# Patient Record
Sex: MALE | Race: WHITE | NOT HISPANIC OR LATINO | Employment: FULL TIME | URBAN - METROPOLITAN AREA
[De-identification: names, ages, dates, MRNs, and addresses within clinical notes are randomized per-mention and may not be internally consistent; named-entity substitution may affect disease eponyms.]

---

## 2017-10-31 ENCOUNTER — THERAPY VISIT (OUTPATIENT)
Dept: PHYSICAL THERAPY | Facility: CLINIC | Age: 52
End: 2017-10-31
Payer: COMMERCIAL

## 2017-10-31 DIAGNOSIS — M54.2 NECK PAIN: Primary | ICD-10-CM

## 2017-10-31 PROCEDURE — 97161 PT EVAL LOW COMPLEX 20 MIN: CPT | Mod: GP | Performed by: PHYSICAL THERAPIST

## 2017-10-31 PROCEDURE — 97110 THERAPEUTIC EXERCISES: CPT | Mod: GP | Performed by: PHYSICAL THERAPIST

## 2017-10-31 NOTE — LETTER
[facility] PHYSICAL THERAPY  [address] [address]  [phone]    2017    Re: Ha Dowell   :   1965  MRN:  [id number]   REFERRING PHYSICIAN:   Jacklyn Vásquez    [facility] PHYSICAL THERAPY    Date of Initial Evaluation:  10/31/2017  Visits:  Rxs Used: 1  Reason for Referral:  Neck pain    EVALUATION SUMMARY    Subjective:  Patient is a 52 year old male presenting with rehab cervical spine hpi. Ha Dowell is a 52 year old male with a cervical spine condition.  Condition occurred with:  Degenerative joint disease.  Condition occurred: at home.  This is a new condition  Jacob reports today with complaints of Neck pain of which he has had issues with over the years. He reports that he has been noticing numbness on the R side of his face. He reports that tipping his head to the L gets rid of it. He gets it roughly 6 - 8 times a day. He states that sleeping on his R side is better than the L. He denies pain down his arm other than R tennis elbow type symptoms. He states that the pain in his neck is more on the R and when that is worse so is the numbness on the R side of his face..  Patient reports pain:  Cervical right side, upper cervical spine, cervical left side, mid cervical spine, central cervical spine and lower cervical spine.  Radiates to:  Face, shoulder right and head.  Pain is described as aching and is intermittent and reported as 8/10 and 4/10.  Associated symptoms:  Loss of strength, loss of motion/stiffness and headache. Pain is the same all the time.  Symptoms are exacerbated by looking up or down, carrying, certain positions, change of position, rotating head, lifting, lying down and driving and relieved by activity/movement.  Since onset symptoms are unchanged.  Special testing: none.  Previous treatment: none.  Improvement with previous treatment: na.  General health as reported by patient is excellent.  Pertinent medical history  includes:  None.  Medical allergies: no.  Other surgeries include:  No.  Current medications:  None as reported by the patient.  Current occupation is Desk job property management.  Patient is working in normal job without restrictions.  Primary job tasks include:  Prolonged sitting, repetitive tasks and prolonged standing.Barriers include:  None as reported by the patient. Red flags:  None as reported by the patient.              Objective:  CERVICAL:  Posture: forward head and rounded shoulders.   Neurological:  Motor Deficit:  Myotomes L R   C4 (shoulder elevation) wnl wnl   C5 (shoulder abduction) wnl wnl   C6 (elbow flexion) wnl wnl   C7 (elbow extension) wnl wnl   C8 (thumb extension) wnl wnl   T1 (finger add/abd) wnl wnl    Strength (lb)     Sensory Deficit, Reflexes, Dural Signs: wnl  AROM: (Major, Moderate, Minimal or Nil loss)  Movement Loss Ciro Mod Min Nil Pain   Protrusion   x     Flexion   x     Retraction  x      Extension  x      Left Rotation  x      Right Rotation  x      Left Side Bending  x      Right Side bending  x        Repeated movement testing:   (During: produces, abolishes, increases, decreases, no effect, centralizing, peripheralizing; After: better, worse, no better, no worse, no effect, centralized, peripheralized)  Repeated cerv retraction increased ROM but still had tension in suboccipitals and R levator  (-) Spurlings and reverse sprulings  Suspect some facet arthopathy as well as muscle tension. The numbness in and along R jaw was difficult to cause today upon eval. I suspect that that is due to referred pain from upper cervical / suboccipital tension. Will continue to assess.     POC decrease tension in upper cervical, up trap , levator primarily on R side. Progress postural exercises and scap stab.     Assessment/Plan:    Patient is a 52 year old male with cervical complaints.    Patient has the following significant findings with corresponding treatment plan.                 Diagnosis 1:  Neck pain  Pain -  hot/cold therapy, US, electric stimulation, mechanical traction, manual therapy, self management, education, directional preference exercise and home program  Decreased ROM/flexibility - manual therapy, therapeutic exercise, therapeutic activity and home program  Decreased joint mobility - manual therapy, therapeutic exercise, therapeutic activity and home program  Decreased strength - therapeutic exercise, therapeutic activities and home program  Impaired muscle performance - neuro re-education and home program  Decreased function - therapeutic activities and home program  Impaired posture - neuro re-education, therapeutic activities and home program  Therapy Evaluation Codes:   1) History comprised of:   Personal factors that impact the plan of care:      Time since onset of symptoms and Work status.    Comorbidity factors that impact the plan of care are:      None.     Medications impacting care: None.  2) Examination of Body Systems comprised of:   Body structures and functions that impact the plan of care:      Cervical spine.   Activity limitations that impact the plan of care are:      Driving, Dressing, Reading/Computer work, Sitting, Walking, Working and Sleeping.  3) Clinical presentation characteristics are:   Stable/Uncomplicated.  4) Decision-Making    Low complexity using standardized patient assessment instrument and/or measureable assessment of functional outcome.  Cumulative Therapy Evaluation is: Low complexity.  Previous and current functional limitations:  (See Goal Flow Sheet for this information)    Short term and Long term goals: (See Goal Flow Sheet for this information)   Communication ability:  Patient appears to be able to clearly communicate and understand verbal and written communication and follow directions correctly.  Treatment Explanation - The following has been discussed with the patient:   RX ordered/plan of care  Anticipated outcomes  Possible  risks and side effects  This patient would benefit from PT intervention to resume normal activities.   Rehab potential is good.  Frequency:  1 X week, once daily  Duration:  for 8 weeks  Discharge Plan:  Achieve all LTG.  Independent in home treatment program.  Reach maximal therapeutic benefit.            Thank you for your referral.    INQUIRIES  Therapist:JOANA Ayala [facility] PHYSICAL THERAPY  [address] [place] [address]  Phone: [phone]  Fax: [phone]

## 2017-10-31 NOTE — PROGRESS NOTES
Subjective:    Patient is a 52 year old male presenting with rehab cervical spine hpi.   Ha Dowell is a 52 year old male with a cervical spine condition.  Condition occurred with:  Degenerative joint disease.  Condition occurred: at home.  This is a new condition  Jacob reports today with complaints of Neck pain of which he has had issues with over the years. He reports that he has been noticing numbness on the R side of his face. He reports that tipping his head to the L gets rid of it. He gets it roughly 6 - 8 times a day. He states that sleeping on his R side is better than the L. He denies pain down his arm other than R tennis elbow type symptoms. He states that the pain in his neck is more on the R and when that is worse so is the numbness on the R side of his face..    Patient reports pain:  Cervical right side, upper cervical spine, cervical left side, mid cervical spine, central cervical spine and lower cervical spine.  Radiates to:  Face, shoulder right and head.  Pain is described as aching and is intermittent and reported as 8/10 and 4/10.  Associated symptoms:  Loss of strength, loss of motion/stiffness and headache. Pain is the same all the time.  Symptoms are exacerbated by looking up or down, carrying, certain positions, change of position, rotating head, lifting, lying down and driving and relieved by activity/movement.  Since onset symptoms are unchanged.  Special testing: none.  Previous treatment: none.  Improvement with previous treatment: na.  General health as reported by patient is excellent.  Pertinent medical history includes:  None.  Medical allergies: no.  Other surgeries include:  No.  Current medications:  None as reported by the patient.  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  Patient is working in normal job without restrictions.  Primary job tasks include:  Prolonged sitting, repetitive tasks and prolonged standing.    Barriers include:  None as reported by the  patient.    Red flags:  None as reported by the patient.                        Objective:  CERVICAL:    Posture: forward head and rounded shoulders.     Neurological:    Motor Deficit:  Myotomes L R   C4 (shoulder elevation) wnl wnl   C5 (shoulder abduction) wnl wnl   C6 (elbow flexion) wnl wnl   C7 (elbow extension) wnl wnl   C8 (thumb extension) wnl wnl   T1 (finger add/abd) wnl wnl    Strength (lb)       Sensory Deficit, Reflexes, Dural Signs: wnl    AROM: (Major, Moderate, Minimal or Nil loss)  Movement Loss Ciro Mod Min Nil Pain   Protrusion   x     Flexion   x     Retraction  x      Extension  x      Left Rotation  x      Right Rotation  x      Left Side Bending  x      Right Side bending  x        Repeated movement testing:   (During: produces, abolishes, increases, decreases, no effect, centralizing, peripheralizing; After: better, worse, no better, no worse, no effect, centralized, peripheralized)    Repeated cerv retraction increased ROM but still had tension in suboccipitals and R levator    (-) Spurlings and reverse sprulings      Suspect some facet arthopathy as well as muscle tension. The numbness in and along R jaw was difficult to cause today upon eval. I suspect that that is due to referred pain from upper cervical / suboccipital tension. Will continue to assess.     POC decrease tension in upper cervical, up trap , levator primarily on R side. Progress postural exercises and scap stab.     System    Physical Exam    General     ROS    Assessment/Plan:      Patient is a 52 year old male with cervical complaints.    Patient has the following significant findings with corresponding treatment plan.                Diagnosis 1:  Neck pain  Pain -  hot/cold therapy, US, electric stimulation, mechanical traction, manual therapy, self management, education, directional preference exercise and home program  Decreased ROM/flexibility - manual therapy, therapeutic exercise, therapeutic activity and home  program  Decreased joint mobility - manual therapy, therapeutic exercise, therapeutic activity and home program  Decreased strength - therapeutic exercise, therapeutic activities and home program  Impaired muscle performance - neuro re-education and home program  Decreased function - therapeutic activities and home program  Impaired posture - neuro re-education, therapeutic activities and home program    Therapy Evaluation Codes:   1) History comprised of:   Personal factors that impact the plan of care:      Time since onset of symptoms and Work status.    Comorbidity factors that impact the plan of care are:      None.     Medications impacting care: None.  2) Examination of Body Systems comprised of:   Body structures and functions that impact the plan of care:      Cervical spine.   Activity limitations that impact the plan of care are:      Driving, Dressing, Reading/Computer work, Sitting, Walking, Working and Sleeping.  3) Clinical presentation characteristics are:   Stable/Uncomplicated.  4) Decision-Making    Low complexity using standardized patient assessment instrument and/or measureable assessment of functional outcome.  Cumulative Therapy Evaluation is: Low complexity.    Previous and current functional limitations:  (See Goal Flow Sheet for this information)    Short term and Long term goals: (See Goal Flow Sheet for this information)     Communication ability:  Patient appears to be able to clearly communicate and understand verbal and written communication and follow directions correctly.  Treatment Explanation - The following has been discussed with the patient:   RX ordered/plan of care  Anticipated outcomes  Possible risks and side effects  This patient would benefit from PT intervention to resume normal activities.   Rehab potential is good.    Frequency:  1 X week, once daily  Duration:  for 8 weeks  Discharge Plan:  Achieve all LTG.  Independent in home treatment program.  Reach maximal  therapeutic benefit.    Please refer to the daily flowsheet for treatment today, total treatment time and time spent performing 1:1 timed codes.

## 2017-11-07 ENCOUNTER — THERAPY VISIT (OUTPATIENT)
Dept: PHYSICAL THERAPY | Facility: CLINIC | Age: 52
End: 2017-11-07
Payer: COMMERCIAL

## 2017-11-07 DIAGNOSIS — M54.2 NECK PAIN: ICD-10-CM

## 2017-11-07 PROCEDURE — 97140 MANUAL THERAPY 1/> REGIONS: CPT | Mod: GP | Performed by: PHYSICAL THERAPY ASSISTANT

## 2017-11-07 PROCEDURE — 97110 THERAPEUTIC EXERCISES: CPT | Mod: GP | Performed by: PHYSICAL THERAPY ASSISTANT

## 2017-11-14 ENCOUNTER — THERAPY VISIT (OUTPATIENT)
Dept: PHYSICAL THERAPY | Facility: CLINIC | Age: 52
End: 2017-11-14
Payer: COMMERCIAL

## 2017-11-14 DIAGNOSIS — M54.2 NECK PAIN: ICD-10-CM

## 2017-11-14 PROCEDURE — 97140 MANUAL THERAPY 1/> REGIONS: CPT | Mod: GP | Performed by: PHYSICAL THERAPIST

## 2017-11-14 PROCEDURE — 97110 THERAPEUTIC EXERCISES: CPT | Mod: GP | Performed by: PHYSICAL THERAPIST

## 2017-11-21 ENCOUNTER — THERAPY VISIT (OUTPATIENT)
Dept: PHYSICAL THERAPY | Facility: CLINIC | Age: 52
End: 2017-11-21
Payer: COMMERCIAL

## 2017-11-21 DIAGNOSIS — M54.2 NECK PAIN: ICD-10-CM

## 2017-11-21 PROCEDURE — 97110 THERAPEUTIC EXERCISES: CPT | Mod: GP | Performed by: PHYSICAL THERAPIST

## 2017-11-21 PROCEDURE — 97140 MANUAL THERAPY 1/> REGIONS: CPT | Mod: GP | Performed by: PHYSICAL THERAPIST

## 2017-12-05 ENCOUNTER — THERAPY VISIT (OUTPATIENT)
Dept: PHYSICAL THERAPY | Facility: CLINIC | Age: 52
End: 2017-12-05
Payer: COMMERCIAL

## 2017-12-05 DIAGNOSIS — M54.2 NECK PAIN: ICD-10-CM

## 2017-12-05 PROCEDURE — 97112 NEUROMUSCULAR REEDUCATION: CPT | Mod: GP | Performed by: PHYSICAL THERAPY ASSISTANT

## 2017-12-05 PROCEDURE — 97110 THERAPEUTIC EXERCISES: CPT | Mod: GP | Performed by: PHYSICAL THERAPY ASSISTANT

## 2017-12-05 PROCEDURE — 97140 MANUAL THERAPY 1/> REGIONS: CPT | Mod: GP | Performed by: PHYSICAL THERAPY ASSISTANT

## 2018-03-10 ENCOUNTER — HOSPITAL ENCOUNTER (EMERGENCY)
Facility: CLINIC | Age: 53
Discharge: HOME | End: 2018-03-10
Attending: FAMILY MEDICINE | Admitting: FAMILY MEDICINE
Payer: COMMERCIAL

## 2018-03-10 VITALS
WEIGHT: 187 LBS | OXYGEN SATURATION: 99 % | RESPIRATION RATE: 18 BRPM | HEART RATE: 90 BPM | DIASTOLIC BLOOD PRESSURE: 93 MMHG | SYSTOLIC BLOOD PRESSURE: 132 MMHG | BODY MASS INDEX: 25.33 KG/M2 | TEMPERATURE: 98 F | HEIGHT: 72 IN

## 2018-03-10 DIAGNOSIS — R10.32 ABDOMINAL PAIN, LEFT LOWER QUADRANT: ICD-10-CM

## 2018-03-10 PROCEDURE — 99282 EMERGENCY DEPT VISIT SF MDM: CPT | Performed by: FAMILY MEDICINE

## 2018-03-10 PROCEDURE — 99284 EMERGENCY DEPT VISIT MOD MDM: CPT | Mod: Z6 | Performed by: FAMILY MEDICINE

## 2018-03-10 RX ORDER — METRONIDAZOLE 500 MG/1
500 TABLET ORAL 3 TIMES DAILY
Qty: 30 TABLET | Refills: 0 | Status: SHIPPED | OUTPATIENT
Start: 2018-03-10 | End: 2018-03-20

## 2018-03-10 RX ORDER — CIPROFLOXACIN 500 MG/1
500 TABLET, FILM COATED ORAL 2 TIMES DAILY
Qty: 20 TABLET | Refills: 0 | Status: SHIPPED | OUTPATIENT
Start: 2018-03-10 | End: 2018-03-20

## 2018-03-10 NOTE — ED NOTES
Pt has HX of diverticulitis and is having s/sx again that started yesterday afternoon. Stared with abd pain, pt denies N/V/D. is able to take in fluids well and soft foods. Has had loose stools but feels it's from his stool softners and laxities. Denies fever/chills. States he is hoping to just get a RX. Is under a lot of stress due to his step father passing recently and was wondering if stress adds to his flair up. Pt is a/o x 4. BM + x 4. No UTI s/sx

## 2018-03-10 NOTE — ED PROVIDER NOTES
History     Chief Complaint   Patient presents with     Abdominal Pain     hx of diverticulitis     HPI  Ha Dowell is a 52 year old male who has a history of diverticulitis, hyperlipidemia, calculus of kidney, cervical radicular pain, and low back pain who presents to the ED for evaluation of abdominal pain. Patient reports onset of abdominal discomfort yesterday afternoon. He notes that the onset of his symptoms were similar to previous symptoms of diverticulitis with symptoms of sensation of constipation or upset stomach. Since that time, he has used Dulcolax without alleviation of pain. His pain has steadily increased since onset. His abdominal pain is a 5/10. Patient notes that he is drinking fluids well, but admits that he has been under stress in the last two months after unexpected death of his step father. He notes that he may not have been hydrating well in the last two months.    Patient's first episode of diverticulitis was in 2012 which he states was his most severe episode. At this time, he had 10/10 pain and was hospitalized for two days. Since this time, he estimates 2-3 more episodes that were less severe. Patient had recent CT abdomen and pelvis on 11/13/2017 which showed diverticulosis of the sigmoid colon, impression below. Patient had a colonoscopy in 2013 which also showed diverticulosis of the colon, findings below. Patient has no known drug allergies. Patient denies fever, chill, nausea, vomiting, dysuria, shortness of breath, or chest pain.     Previous Records Reviewed  CT ABDOMEN AND PELVIS WITH AND WITHOUT INTRAVENOUS CONTRAST  11/13/2017 1:43 PM.  FINDINGS:   Redemonstration of numerous small and large hepatic cysts, grossly unchanged. Spleen, pancreas, and adrenal glands are unremarkable. Bilateral small nonobstructing renal stones again noted, right greater than left. No ureteral stones. No hydronephrosis or ureteral dilatation. Urinary bladder is decompressed. No enlarged  lymph nodes. No abdominal aortic aneurysm. No ascites. No small bowel dilatation. Appendix not identified and there for not evaluated. Sigmoid diverticulosis without CT features of diverticulitis.  ASC Colonoscopy 3/22/2013 10:19 AM CDT  IMPRESSION:  Left-sided colonic diverticulosis.    Problem List:    Patient Active Problem List    Diagnosis Date Noted     Neck pain 10/31/2017     Priority: Medium        Past Medical History:    No past medical history on file.    Past Surgical History:    No past surgical history on file.    Family History:    No family history on file.    Social History:  Marital Status:  Single [1]  Social History   Substance Use Topics     Smoking status: Not on file     Smokeless tobacco: Not on file     Alcohol use Not on file        Medications:      ciprofloxacin (CIPRO) 500 MG tablet   metroNIDAZOLE (FLAGYL) 500 MG tablet         Review of Systems  All other systems are reviewed and are negative    Physical Exam   BP: (!) 132/93  Pulse: 90  Temp: 98  F (36.7  C)  Resp: 18  Height: 182.9 cm (6')  Weight: 84.8 kg (187 lb)  SpO2: 99 %      Physical Exam  Nursing note and vitals were reviewed.  Constitutional: Awake and alert, adequately nourished and developed appearing 52-year-old in no apparent discomfort, who does not appear acutely ill, and who answers questions appropriately and cooperates with examination.  HEENT: EOMI.   Neck: Freely mobile.  Cardiovascular: Cardiac examination reveals normal heart rate and regular rhythm without murmur.  Pulmonary/Chest: Breathing is unlabored.  Breath sounds are clear and equal bilaterally.  There no retractions, tachypnea, rales, wheezes, or rhonchi.  Abdomen: Soft, tender in the left lower quadrant with referred tenderness to the left lower quadrant from the right lower quadrant with localized guarding and localized rebound but no generalized rebound, no generalized guarding, no HSM or masses.  No abdominal distention.  Musculoskeletal:  Extremities are warm and well-perfused and without edema  Neurological: Alert, oriented, thought content logical, coherent   Skin: Warm, dry, no rashes.  Psychiatric: Affect broad and appropriate.        ED Course     ED Course     Procedures               Critical Care time:  none               No results found for this or any previous visit (from the past 24 hour(s)).     17:25 PM  Patient assessed. Course of care outlined.    Assessments & Plan (with Medical Decision Making)     52-year-old male presents with left lower quadrant abdominal pain that he says is typical of prior episodes of diverticulitis.  Physical examination does not show evidence of generalized peritonitis or an acute abdomen.  He would like to be treated symptomatically.  I think this is reasonable.  I explained to him that doing so risks of missing an alternative diagnosis or more serious type of diverticulitis such as diverticular abscess.  However my suspicion for free perforation is quite low based on his symptoms and exam.  I think that empiric treatment is reasonable as long as he understands that he must return if he has not improved in 24-48 hours and has resolution within 7-10 days.  He is comfortable with this plan.  He will take ciprofloxacin plus metronidazole for 10 days.  Dietary instructions were reviewed.  Return if not improved or worsening.    I have reviewed the nursing notes.    I have reviewed the findings, diagnosis, plan and need for follow up with the patient.       Discharge Medication List as of 3/10/2018  5:54 PM      START taking these medications    Details   ciprofloxacin (CIPRO) 500 MG tablet Take 1 tablet (500 mg) by mouth 2 times daily for 10 days, Disp-20 tablet, R-0, E-Prescribe      metroNIDAZOLE (FLAGYL) 500 MG tablet Take 1 tablet (500 mg) by mouth 3 times daily for 10 days, Disp-30 tablet, R-0, E-Prescribe             Final diagnoses:   Abdominal pain, left lower quadrant     This document serves as a  record of the services and decisions personally performed and made by Frank Hines MD. It was created on HIS/HER behalf by   Shona Malloy, a trained medical scribe. The creation of this document is based the provider's statements to the medical scribe.  Shona Malloy 5:25 PM 3/10/2018    Provider:   The information in this document, created by the medical scribe for me, accurately reflects the services I personally performed and the decisions made by me. I have reviewed and approved this document for accuracy prior to leaving the patient care area.  Frank Hines MD 5:25 PM 3/10/2018    3/10/2018   [facility] EMERGENCY DEPARTMENT     Frank Hines MD  03/11/18 [id number]

## 2018-03-10 NOTE — ED AVS SNAPSHOT
[facility] Emergency Department    [address] [address]    Phone:  [phone]    Fax:  [phone]                                       Ha Dowell   MRN: [id number]    Department:  [facility] Emergency Department   Date of Visit:  3/10/2018           After Visit Summary Signature Page     I have received my discharge instructions, and my questions have been answered. I have discussed any challenges I see with this plan with the nurse or doctor.    ..........................................................................................................................................  Patient/Patient Representative Signature      ..........................................................................................................................................  Patient Representative Print Name and Relationship to Patient    ..................................................               ................................................  Date                                            Time    ..........................................................................................................................................  Reviewed by Signature/Title    ...................................................              ..............................................  Date                                                            Time

## 2018-03-10 NOTE — DISCHARGE INSTRUCTIONS
Take ciprofloxacin 500 mg twice daily for 10 days.  Take metronidazole 500 mg 3 times daily for 10 days.  Return to be seen if not improved in 24-48 hours or if new or worsening symptoms at any time, including increased pain, nausea, fevers, vomiting, or other new concerning symptoms.

## 2018-03-10 NOTE — ED AVS SNAPSHOT
[facility] Emergency Department    [address] [address] [address]    Phone:  [phone]    Fax:  [phone]                                       Ha Dowell   MRN: [id number]    Department:  [facility] Emergency Department   Date of Visit:  3/10/2018           Patient Information     Date Of Birth          1965        Your diagnoses for this visit were:     Abdominal pain, left lower quadrant        You were seen by Frank Hines MD.        Discharge Instructions       Take ciprofloxacin 500 mg twice daily for 10 days.  Take metronidazole 500 mg 3 times daily for 10 days.  Return to be seen if not improved in 24-48 hours or if new or worsening symptoms at any time, including increased pain, nausea, fevers, vomiting, or other new concerning symptoms.      Discharge References/Attachments     DIVERTICULITIS (ENGLISH)      24 Hour Appointment Hotline       To make an appointment at any [place] clinic, call [phone] ([phone]). If you don't have a family doctor or clinic, we will help you find one. [place] [facility] clinics are conveniently located to serve the needs of you and your family.             Review of your medicines      START taking        Dose / Directions Last dose taken    ciprofloxacin 500 MG tablet   Commonly known as:  CIPRO   Dose:  500 mg   Quantity:  20 tablet        Take 1 tablet (500 mg) by mouth 2 times daily for 10 days   Refills:  0        metroNIDAZOLE 500 MG tablet   Commonly known as:  FLAGYL   Dose:  500 mg   Quantity:  30 tablet        Take 1 tablet (500 mg) by mouth 3 times daily for 10 days   Refills:  0                Prescriptions were sent or printed at these locations (2 Prescriptions)                   [facility] [address]   [address] [address]    Telephone:  [phone]   Fax:  [phone]   Hours:                  E-Prescribed (2 of 2)         ciprofloxacin (CIPRO) 500 MG tablet  "              metroNIDAZOLE (FLAGYL) 500 MG tablet                Orders Needing Specimen Collection     None      Pending Results     No orders found from 3/8/2018 to 3/11/2018.            Pending Culture Results     No orders found from 3/8/2018 to 3/11/2018.            Pending Results Instructions     If you had any lab results that were not finalized at the time of your Discharge, you can call the ED Lab Result RN at [phone]. You will be contacted by this team for any positive Lab results or changes in treatment. The nurses are available 7 days a week from 10A to 6:30P.  You can leave a message 24 hours per day and they will return your call.        Test Results From Your Hospital Stay               Thank you for choosing [facility]       Thank you for choosing [facility] for your care. Our goal is always to provide you with excellent care. Hearing back from our patients is one way we can continue to improve our services. Please take a few minutes to complete the written survey that you may receive in the mail after you visit with us. Thank you!        [employer] Information     [employer] lets you send messages to your doctor, view your test results, renew your prescriptions, schedule appointments and more. To sign up, go to [email or web address] . Click on \"Log in\" on the left side of the screen, which will take you to the Welcome page. Then click on \"Sign up Now\" on the right side of the page.     You will be asked to enter the access code listed below, as well as some personal information. Please follow the directions to create your username and password.     Your access code is: [email or web address]  Expires: 2018  5:54 PM     Your access code will  in 90 days. If you need help or a new code, please call your [facility] clinic or [phone].        Care EveryWhere ID     This is your Care EveryWhere ID. This could be used by other organizations to access your [facility] medical records  [id number]      "   Equal Access to Services     SAHARA HARRIS : Hank Li, francois lee, alexander song. So [facility] [phone].    ATENCIÓN: Si habla español, tiene a blum disposición servicios gratuitos de asistencia lingüística. Llame al [phone].    We comply with applicable federal civil rights laws and [place] laws. We do not discriminate on the basis of race, color, national origin, age, disability, sex, sexual orientation, or gender identity.            After Visit Summary       This is your record. Keep this with you and show to your community pharmacist(s) and doctor(s) at your next visit.

## 2018-03-12 ENCOUNTER — HOSPITAL ENCOUNTER (EMERGENCY)
Facility: CLINIC | Age: 53
Discharge: HOME | End: 2018-03-12
Attending: EMERGENCY MEDICINE | Admitting: EMERGENCY MEDICINE
Payer: COMMERCIAL

## 2018-03-12 ENCOUNTER — APPOINTMENT (OUTPATIENT)
Dept: CT IMAGING | Facility: CLINIC | Age: 53
End: 2018-03-12
Attending: EMERGENCY MEDICINE
Payer: COMMERCIAL

## 2018-03-12 VITALS
OXYGEN SATURATION: 95 % | RESPIRATION RATE: 16 BRPM | DIASTOLIC BLOOD PRESSURE: 89 MMHG | TEMPERATURE: 98.1 F | SYSTOLIC BLOOD PRESSURE: 136 MMHG

## 2018-03-12 DIAGNOSIS — K57.92 ACUTE DIVERTICULITIS: ICD-10-CM

## 2018-03-12 PROCEDURE — 25000128 H RX IP 250 OP 636: Performed by: EMERGENCY MEDICINE

## 2018-03-12 PROCEDURE — 99284 EMERGENCY DEPT VISIT MOD MDM: CPT | Mod: Z6 | Performed by: EMERGENCY MEDICINE

## 2018-03-12 PROCEDURE — 25000125 ZZHC RX 250: Performed by: EMERGENCY MEDICINE

## 2018-03-12 PROCEDURE — 99284 EMERGENCY DEPT VISIT MOD MDM: CPT | Mod: 25

## 2018-03-12 PROCEDURE — 74177 CT ABD & PELVIS W/CONTRAST: CPT

## 2018-03-12 RX ORDER — IOPAMIDOL 755 MG/ML
100 INJECTION, SOLUTION INTRAVASCULAR ONCE
Status: COMPLETED | OUTPATIENT
Start: 2018-03-12 | End: 2018-03-12

## 2018-03-12 RX ORDER — LORAZEPAM 1 MG/1
.5-1 TABLET ORAL
COMMUNITY
Start: 2018-02-02

## 2018-03-12 RX ORDER — ALBUTEROL SULFATE 90 UG/1
1-2 AEROSOL, METERED RESPIRATORY (INHALATION) EVERY 4 HOURS PRN
COMMUNITY
Start: 2017-11-20

## 2018-03-12 RX ORDER — MONTELUKAST SODIUM 10 MG/1
10 TABLET ORAL DAILY
COMMUNITY

## 2018-03-12 RX ORDER — ATORVASTATIN CALCIUM 20 MG/1
20 TABLET, FILM COATED ORAL EVERY MORNING
COMMUNITY
Start: 2017-03-03

## 2018-03-12 RX ORDER — CETIRIZINE HYDROCHLORIDE 10 MG/1
10 TABLET ORAL DAILY
COMMUNITY

## 2018-03-12 RX ORDER — FINASTERIDE 1 MG/1
1 TABLET, FILM COATED ORAL DAILY
COMMUNITY

## 2018-03-12 RX ADMIN — SODIUM CHLORIDE 60 ML: 9 INJECTION, SOLUTION INTRAVENOUS at 18:47

## 2018-03-12 RX ADMIN — IOPAMIDOL 100 ML: 755 INJECTION, SOLUTION INTRAVENOUS at 18:47

## 2018-03-12 ASSESSMENT — PAIN DESCRIPTION - DESCRIPTORS
DESCRIPTORS: ACHING
DESCRIPTORS: ACHING

## 2018-03-12 NOTE — ED AVS SNAPSHOT
[facility] Emergency Department    [address] [address]    Phone:  [phone]    Fax:  [phone]                                       Ha Dowell   MRN: [id number]    Department:  [facility] Emergency Department   Date of Visit:  3/12/2018           After Visit Summary Signature Page     I have received my discharge instructions, and my questions have been answered. I have discussed any challenges I see with this plan with the nurse or doctor.    ..........................................................................................................................................  Patient/Patient Representative Signature      ..........................................................................................................................................  Patient Representative Print Name and Relationship to Patient    ..................................................               ................................................  Date                                            Time    ..........................................................................................................................................  Reviewed by Signature/Title    ...................................................              ..............................................  Date                                                            Time

## 2018-03-12 NOTE — ED AVS SNAPSHOT
[facility] Emergency Department    [address] [address]    Phone:  [phone]    Fax:  [phone]                                       Ha Dowell   MRN: [id number]    Department:  [facility] Emergency Department   Date of Visit:  3/12/2018           Patient Information     Date Of Birth          1965        Your diagnoses for this visit were:     Acute diverticulitis        You were seen by Reji Cruz DO.        Discharge Instructions       CT scan shows no concerns for kidney stone causing your pain at this time.  CT did confirm presence for some inflammation around a diverticulum consistent with acute diverticulitis.  Recommend finishing ciprofloxacin and metronidazole.  May resume normal diet.  May travel.  Recommend returning to a local emergency department if you develop fever, bloody stools or worsening abdominal pain.      24 Hour Appointment Hotline       To make an appointment at any [place] clinic, call [phone] ([phone]). If you don't have a family doctor or clinic, we will help you find one. [place] [place] clinics are conveniently located to serve the needs of you and your family.             Review of your medicines      Our records show that you are taking the medicines listed below. If these are incorrect, please call your family doctor or clinic.        Dose / Directions Last dose taken    albuterol 108 (90 BASE) MCG/ACT Inhaler   Commonly known as:  PROAIR HFA/PROVENTIL HFA/VENTOLIN HFA   Dose:  1-2 puff        Inhale 1-2 puffs into the lungs every 4 hours as needed   Refills:  0        aspirin 81 MG EC tablet   Dose:  81 mg        Take 81 mg by mouth daily   Refills:  0        atorvastatin 20 MG tablet   Commonly known as:  LIPITOR   Dose:  20 mg        Take 20 mg by mouth every morning   Refills:  0        cetirizine 10 MG tablet   Commonly known as:  zyrTEC   Dose:  10 mg        Take 10 mg by mouth daily   Refills:  0         ciprofloxacin 500 MG tablet   Commonly known as:  CIPRO   Dose:  500 mg   Quantity:  20 tablet        Take 1 tablet (500 mg) by mouth 2 times daily for 10 days   Refills:  0        CITRUCEL 500 MG Tabs tablet   Generic drug:  methylcellulose        Take by mouth daily 5 tablets spread out through the day   Refills:  0        finasteride 1 MG tablet   Commonly known as:  PROPECIA   Dose:  1 tablet        Take 1 tablet by mouth daily   Refills:  0        LORazepam 1 MG tablet   Commonly known as:  ATIVAN   Dose:  0.5-1 mg        Take 0.5-1 mg by mouth nightly as needed   Refills:  0        metroNIDAZOLE 500 MG tablet   Commonly known as:  FLAGYL   Dose:  500 mg   Quantity:  30 tablet        Take 1 tablet (500 mg) by mouth 3 times daily for 10 days   Refills:  0        montelukast 10 MG tablet   Commonly known as:  SINGULAIR   Dose:  10 mg        Take 10 mg by mouth daily   Refills:  0        WAL-PHED PE PO   Dose:  1 tablet        Take 1 tablet by mouth as needed   Refills:  0                Procedures and tests performed during your visit     CT Abdomen Pelvis w Contrast    Give 20 ounces of water 15 minutes before CT of abdomen      Orders Needing Specimen Collection     None      Pending Results     Date and Time Order Name Status Description    3/12/2018 1816 CT Abdomen Pelvis w Contrast Preliminary             Pending Culture Results     No orders found from 3/10/2018 to 3/13/2018.            Pending Results Instructions     If you had any lab results that were not finalized at the time of your Discharge, you can call the ED Lab Result RN at [phone]. You will be contacted by this team for any positive Lab results or changes in treatment. The nurses are available 7 days a week from 10A to 6:30P.  You can leave a message 24 hours per day and they will return your call.        Test Results From Your Hospital Stay        3/12/2018  7:31 PM      Narrative     CT ABDOMEN AND PELVIS WITH CONTRAST   3/12/2018 6:56  PM     HISTORY: Abdominal and back pain.    COMPARISON: None.    TECHNIQUE: Following the uneventful administration of 100 mL  Isovue-370 intravenous contrast, helical sections were acquired from  the top of the diaphragm through the pubic symphysis. Coronal  reconstructions were generated. Radiation dose for this scan was  reduced using automated exposure control, adjustment of the mA and/or  kV according to the patient's size, or iterative reconstruction  technique.    FINDINGS:   Abdomen: Numerous cysts are scattered within the liver, predominantly  within the right lobe. The largest cyst in the right lobe is in the  dome and measures 12 cm in diameter. The spleen, pancreas, adrenal  glands are unremarkable. Single subcentimeter low-attenuation lesions  in the lower poles of both kidneys, too small to characterize. The  gallbladder is present. No enlarged lymph nodes or free fluid in the  upper abdomen.    Scan through the lower chest is significant for coronary artery  calcification.    Pelvis: The small and large bowel are normal in caliber. Several  diverticula are present in the colon. Mild haziness is present in the  fat about a diverticulum in a mildly thick-walled distal sigmoid colon  (series 2 image 90). These findings are suggestive of diverticulitis.  No extraluminal gas or fluid collections in the pelvis. The mid  appendix contains calcification, likely appendicolith, and is mildly  dilated, measuring up to 1.4 cm in diameter (series 2 image 72). The  more proximal and more distal appendix are normal in caliber. There is  no periappendiceal stranding. No enlarged lymph nodes or free fluid in  the pelvis.        Impression     IMPRESSION:   1. Diverticulitis of the distal sigmoid colon. No abscess.  2. Calcification, likely appendicoliths, present within a mildly  dilated mid appendix. The more proximal and distal appendix are normal  in caliber and there are no periappendiceal inflammatory  "changes.  These findings are not convincing for acute appendicitis.  3. Numerous prominent hepatic cysts measuring up to 12 cm in diameter.                  Thank you for choosing [facility]       Thank you for choosing [facility] for your care. Our goal is always to provide you with excellent care. Hearing back from our patients is one way we can continue to improve our services. Please take a few minutes to complete the written survey that you may receive in the mail after you visit with us. Thank you!        [employer] Information     [employer] lets you send messages to your doctor, view your test results, renew your prescriptions, schedule appointments and more. To sign up, go to [email or web address] . Click on \"Log in\" on the left side of the screen, which will take you to the Welcome page. Then click on \"Sign up Now\" on the right side of the page.     You will be asked to enter the access code listed below, as well as some personal information. Please follow the directions to create your username and password.     Your access code is: [email or web address]  Expires: 2018  6:54 PM     Your access code will  in 90 days. If you need help or a new code, please call your [facility] clinic or [phone].        Care EveryWhere ID     This is your Care EveryWhere ID. This could be used by other organizations to access your [facility] medical records  [id number]        Equal Access to Services     SAHARA HARRIS : Hadsean Li, waaxda luqadaha, qaybta kaalmada katelyn, alexander monaco. So [facility] [phone].    ATENCIÓN: Si habla español, tiene a blum disposición servicios gratuitos de asistencia lingüística. Nils al [phone].    We comply with applicable federal civil rights laws and [place] laws. We do not discriminate on the basis of race, color, national origin, age, disability, sex, sexual orientation, or gender identity.            After Visit Summary       This is your " record. Keep this with you and show to your community pharmacist(s) and doctor(s) at your next visit.

## 2018-03-13 NOTE — DISCHARGE INSTRUCTIONS
CT scan shows no concerns for kidney stone causing your pain at this time.  CT did confirm presence for some inflammation around a diverticulum consistent with acute diverticulitis.  Recommend finishing ciprofloxacin and metronidazole.  May resume normal diet.  May travel.  Recommend returning to a local emergency department if you develop fever, bloody stools or worsening abdominal pain.

## 2018-03-13 NOTE — ED PROVIDER NOTES
History     Chief Complaint   Patient presents with     Abdominal Pain     started 3 days ago     Back Pain     started after visit here for diverticulitis     HPI  Ha Dowell is a 52 year old male who presents with ongoing abdominal pain and some back pain.  Started 3 days ago.  Was seen over the weekend in the emergency department at [facility].  Patient's symptoms were clinically consistent with diverticulitis.  He has had recurrent diverticulitis in the past.  There is no indication for needing CT imaging at that time.  Patient was started on ciprofloxacin and metronidazole.  He presents with continued pain though states it has improved.  No blood in his stool.  No systemic symptoms such as fever or chills.  His primary concern is that he also knows that his kidney stones and he now is complaining of some back pain.  He plans to travel starting this mid week and wanted CT imaging to determine if he also was potentially passing any kidney stones.    Problem List:    Patient Active Problem List    Diagnosis Date Noted     Neck pain 10/31/2017     Priority: Medium        Past Medical History:    No past medical history on file.    Past Surgical History:    No past surgical history on file.    Family History:    No family history on file.    Social History:  Marital Status:  Single [1]  Social History   Substance Use Topics     Smoking status: Never Smoker     Smokeless tobacco: Never Used     Alcohol use No        Medications:      finasteride (PROPECIA) 1 MG tablet   albuterol (PROAIR HFA/PROVENTIL HFA/VENTOLIN HFA) 108 (90 BASE) MCG/ACT Inhaler   cetirizine (ZYRTEC) 10 MG tablet   montelukast (SINGULAIR) 10 MG tablet   Phenylephrine HCl (WAL-PHED PE PO)   aspirin 81 MG EC tablet   atorvastatin (LIPITOR) 20 MG tablet   LORazepam (ATIVAN) 1 MG tablet   ciprofloxacin (CIPRO) 500 MG tablet   metroNIDAZOLE (FLAGYL) 500 MG tablet   methylcellulose (CITRUCEL) 500 MG TABS tablet         Review of  Systems  All pertinent positives and negatives are documented in the HPI.  All others reviewed and are negative .    Physical Exam   BP: (!) 140/95  Heart Rate: 99  Temp: 98.1  F (36.7  C)  Resp: 16  SpO2: 99 %      Physical Exam  Head:  Normocephalic.    Eyes:  PERRLA, full EOM.  External exams normal.    Ears:  Normal pinnae, canals, and TM's.    Nose:  Patent, without deformity.    Throat:  Moist mucous membranes without lesions, erythema, or exudate.    Neck:  Supple, without masses, lymphadenopathy or tenderness.    Respiratory:  Normal respiratory effort.  Lungs are clear with good breath sounds.    Heart:  RR without murmurs, rubs, or gallops.  Abdomen: Mild tenderness left lower quadrant with no peritoneal signs.  Bowel sounds present  ED Course     ED Course     Procedures                 Results for orders placed or performed during the hospital encounter of 03/12/18 (from the past 24 hour(s))   CT Abdomen Pelvis w Contrast    Narrative    CT ABDOMEN AND PELVIS WITH CONTRAST   3/12/2018 6:56 PM     HISTORY: Abdominal and back pain.    COMPARISON: None.    TECHNIQUE: Following the uneventful administration of 100 mL  Isovue-370 intravenous contrast, helical sections were acquired from  the top of the diaphragm through the pubic symphysis. Coronal  reconstructions were generated. Radiation dose for this scan was  reduced using automated exposure control, adjustment of the mA and/or  kV according to the patient's size, or iterative reconstruction  technique.    FINDINGS:   Abdomen: Numerous cysts are scattered within the liver, predominantly  within the right lobe. The largest cyst in the right lobe is in the  dome and measures 12 cm in diameter. The spleen, pancreas, adrenal  glands are unremarkable. Single subcentimeter low-attenuation lesions  in the lower poles of both kidneys, too small to characterize. The  gallbladder is present. No enlarged lymph nodes or free fluid in the  upper abdomen.    Scan  through the lower chest is significant for coronary artery  calcification.    Pelvis: The small and large bowel are normal in caliber. Several  diverticula are present in the colon. Mild haziness is present in the  fat about a diverticulum in a mildly thick-walled distal sigmoid colon  (series 2 image 90). These findings are suggestive of diverticulitis.  No extraluminal gas or fluid collections in the pelvis. The mid  appendix contains calcification, likely appendicolith, and is mildly  dilated, measuring up to 1.4 cm in diameter (series 2 image 72). The  more proximal and more distal appendix are normal in caliber. There is  no periappendiceal stranding. No enlarged lymph nodes or free fluid in  the pelvis.      Impression    IMPRESSION:   1. Diverticulitis of the distal sigmoid colon. No abscess.  2. Calcification, likely appendicoliths, present within a mildly  dilated mid appendix. The more proximal and distal appendix are normal  in caliber and there are no periappendiceal inflammatory changes.  These findings are not convincing for acute appendicitis.  3. Numerous prominent hepatic cysts measuring up to 12 cm in diameter.         Medications   iopamidol (ISOVUE-370) solution 100 mL (100 mLs Intravenous Given 3/12/18 1847)   sodium chloride 0.9 % bag 500mL for CT scan flush use (60 mLs As instructed Given 3/12/18 1847)       Assessments & Plan (with Medical Decision Making)  Confirmed acute diverticulitis.  No complication.  Recommend continuing Cipro and metronidazole as prescribed by prior physician who he saw the emergency room 2 days ago.     I have reviewed the nursing notes.    I have reviewed the findings, diagnosis, plan and need for follow up with the patient.      New Prescriptions    No medications on file       Final diagnoses:   Acute diverticulitis       3/12/2018   [facility] EMERGENCY DEPARTMENT     Reji Cruz DO  03/12/18 2008

## 2023-11-09 ENCOUNTER — HOSPITAL ENCOUNTER (EMERGENCY)
Facility: HOSPITAL | Age: 58
Discharge: HOME | End: 2023-11-10
Attending: EMERGENCY MEDICINE | Admitting: EMERGENCY MEDICINE
Payer: COMMERCIAL

## 2023-11-09 DIAGNOSIS — F41.9 ANXIETY: ICD-10-CM

## 2023-11-09 DIAGNOSIS — R29.818 TRANSIENT NEUROLOGICAL SYMPTOMS: ICD-10-CM

## 2023-11-09 PROCEDURE — 99284 EMERGENCY DEPT VISIT MOD MDM: CPT | Mod: 25

## 2023-11-10 VITALS
TEMPERATURE: 98.1 F | DIASTOLIC BLOOD PRESSURE: 80 MMHG | WEIGHT: 193.4 LBS | SYSTOLIC BLOOD PRESSURE: 127 MMHG | OXYGEN SATURATION: 95 % | HEART RATE: 81 BPM | RESPIRATION RATE: 23 BRPM | BODY MASS INDEX: 26.19 KG/M2 | HEIGHT: 72 IN

## 2023-11-10 LAB
ALBUMIN SERPL BCG-MCNC: 4.4 G/DL (ref 3.5–5.2)
ALP SERPL-CCNC: 65 U/L (ref 40–129)
ALT SERPL W P-5'-P-CCNC: 20 U/L (ref 0–70)
ANION GAP SERPL CALCULATED.3IONS-SCNC: 9 MMOL/L (ref 7–15)
AST SERPL W P-5'-P-CCNC: 21 U/L (ref 0–45)
BASOPHILS # BLD AUTO: 0.1 10E3/UL (ref 0–0.2)
BASOPHILS NFR BLD AUTO: 1 %
BILIRUB SERPL-MCNC: 0.3 MG/DL
BUN SERPL-MCNC: 17.8 MG/DL (ref 6–20)
CALCIUM SERPL-MCNC: 9.8 MG/DL (ref 8.6–10)
CHLORIDE SERPL-SCNC: 104 MMOL/L (ref 98–107)
CREAT SERPL-MCNC: 1.12 MG/DL (ref 0.67–1.17)
DEPRECATED HCO3 PLAS-SCNC: 25 MMOL/L (ref 22–29)
EGFRCR SERPLBLD CKD-EPI 2021: 76 ML/MIN/1.73M2
EOSINOPHIL # BLD AUTO: 0.2 10E3/UL (ref 0–0.7)
EOSINOPHIL NFR BLD AUTO: 2 %
ERYTHROCYTE [DISTWIDTH] IN BLOOD BY AUTOMATED COUNT: 13.2 % (ref 10–15)
GLUCOSE SERPL-MCNC: 121 MG/DL (ref 70–99)
HCT VFR BLD AUTO: 46.5 % (ref 40–53)
HGB BLD-MCNC: 16.2 G/DL (ref 13.3–17.7)
IMM GRANULOCYTES # BLD: 0.1 10E3/UL
IMM GRANULOCYTES NFR BLD: 1 %
LIPASE SERPL-CCNC: 53 U/L (ref 13–60)
LYMPHOCYTES # BLD AUTO: 1.7 10E3/UL (ref 0.8–5.3)
LYMPHOCYTES NFR BLD AUTO: 26 %
MCH RBC QN AUTO: 31.9 PG (ref 26.5–33)
MCHC RBC AUTO-ENTMCNC: 34.8 G/DL (ref 31.5–36.5)
MCV RBC AUTO: 92 FL (ref 78–100)
MONOCYTES # BLD AUTO: 0.6 10E3/UL (ref 0–1.3)
MONOCYTES NFR BLD AUTO: 10 %
NEUTROPHILS # BLD AUTO: 3.9 10E3/UL (ref 1.6–8.3)
NEUTROPHILS NFR BLD AUTO: 60 %
NRBC # BLD AUTO: 0 10E3/UL
NRBC BLD AUTO-RTO: 0 /100
PLATELET # BLD AUTO: 239 10E3/UL (ref 150–450)
POTASSIUM SERPL-SCNC: 4.6 MMOL/L (ref 3.4–5.3)
PROT SERPL-MCNC: 7.5 G/DL (ref 6.4–8.3)
RBC # BLD AUTO: 5.08 10E6/UL (ref 4.4–5.9)
SODIUM SERPL-SCNC: 138 MMOL/L (ref 135–145)
WBC # BLD AUTO: 6.5 10E3/UL (ref 4–11)

## 2023-11-10 PROCEDURE — 93005 ELECTROCARDIOGRAM TRACING: CPT

## 2023-11-10 PROCEDURE — 83690 ASSAY OF LIPASE: CPT | Performed by: EMERGENCY MEDICINE

## 2023-11-10 PROCEDURE — 96374 THER/PROPH/DIAG INJ IV PUSH: CPT

## 2023-11-10 PROCEDURE — 85025 COMPLETE CBC W/AUTO DIFF WBC: CPT | Performed by: EMERGENCY MEDICINE

## 2023-11-10 PROCEDURE — 93005 ELECTROCARDIOGRAM TRACING: CPT | Performed by: EMERGENCY MEDICINE

## 2023-11-10 PROCEDURE — 36415 COLL VENOUS BLD VENIPUNCTURE: CPT | Performed by: EMERGENCY MEDICINE

## 2023-11-10 PROCEDURE — 80053 COMPREHEN METABOLIC PANEL: CPT | Performed by: EMERGENCY MEDICINE

## 2023-11-10 RX ORDER — LORAZEPAM 2 MG/ML
1 INJECTION INTRAMUSCULAR ONCE
Status: COMPLETED | OUTPATIENT
Start: 2023-11-10 | End: 2023-11-10

## 2023-11-10 ASSESSMENT — ENCOUNTER SYMPTOMS
FACIAL ASYMMETRY: 0
DIZZINESS: 1
CHILLS: 0
SHORTNESS OF BREATH: 0
FEVER: 0
WHEEZING: 1
APPETITE CHANGE: 0
TREMORS: 0
COUGH: 0
HEADACHES: 0
LIGHT-HEADEDNESS: 0
SPEECH DIFFICULTY: 1
NERVOUS/ANXIOUS: 1
NAUSEA: 0
ABDOMINAL PAIN: 0
PALPITATIONS: 0
WEAKNESS: 0
DIAPHORESIS: 0
FATIGUE: 0
CHEST TIGHTNESS: 0
NUMBNESS: 0

## 2023-11-10 ASSESSMENT — ACTIVITIES OF DAILY LIVING (ADL): ADLS_ACUITY_SCORE: 35

## 2023-11-10 NOTE — ED NOTES
Writer attempted an IV, it infiltrated. No IV access but does not want the IV ativan at this time. Pt feels more comfortable now and not anxious anymore that he is here and reassured. Provider notified.

## 2023-11-10 NOTE — ED PROVIDER NOTES
EMERGENCY DEPARTMENT ENCOUNTER      NAME: Ha Dowell  AGE: 58 year old male  YOB: 1965  MRN: [id number]  EVALUATION DATE & TIME: 11/9/2023 11:45 PM    PCP: Jacklyn Vásquez    ED PROVIDER: Willie Mcarthur MD       Chief Complaint   Patient presents with    Possible TIA         FINAL IMPRESSION:  No diagnosis found.      ED COURSE & MEDICAL DECISION MAKING:    Pertinent Labs & Imaging studies reviewed. (See chart for details)  58 year old male with history of anxiety and hyperlipidemia presenting for evaluation of very brief transient difficulty speaking.  Patient has been under a lot of stress with multiple close family friends dying or becoming ill recently.  This is made patient very anxious and worried about his own health.  Tonight he was laying in bed thinking about what was going to happen tomorrow and he remembered that he would be bringing his mother to visit an elderly family friend who has checked herself into hospice and is no longer eating.  He does not fully understand her condition and is very worried about his own health.  He also recalls that a neighbor had a stroke in their sleep and had a very poor outcome.  He when thinking about all this, he started to feel tingling in his mouth and noticed difficulty clearly enunciating numbers when he started counting.  This lasted for about 30 seconds while he got up and walked into the kitchen.  Within less than a minute he reports feeling entirely back to normal and able to speak very clearly.  Denies any trouble with balance, coordination, or any other extremity symptoms.  No headaches.  Otherwise he is feeling entirely normal other than still feeling shaky and anxious.  He does admit to feeling anxious about his own health and all that he has been seeing with his family friends who have been ill and sick recently.  Denying any chest pain or difficulty breathing.  No nausea.  Patient is well-appearing in the ED in no distress.   Neurologic exam is entirely normal.  He only describes as brief roughly 30 seconds window of some abnormal speech.  Although this could be a very brief TIA, I clinically suspect this is more likely a panic attack.  Encouraged close primary care follow-up with return precautions if any new or concerning symptoms develop.       11:50 PM I met with the patient to gather history and perform my exam. ED course and treatment discussed.     1:39 AM Patient re-assessed: Patient remains asymptomatic.  No further symptoms similar to his odd speech trouble this evening.  Patient reports his anxiety is substantially improved since coming here.  Patient does report he is taking a baby aspirin daily.  Encourage patient to continue with daily aspirin and follow-up with his family doctor.    At the conclusion of the encounter I discussed the results of all of the tests and the disposition. The questions were answered. The patient or family acknowledged understanding and was agreeable with the care plan.          MEDICATIONS GIVEN IN THE EMERGENCY:  Medications   LORazepam (ATIVAN) injection 1 mg (has no administration in time range)       NEW PRESCRIPTIONS STARTED AT TODAY'S ER VISIT  New Prescriptions    No medications on file        Medical Decision Making    History:  Supplemental history from: Documented in chart, if applicable  External Record(s) reviewed: Documented in chart, if applicable.    Work Up:  Chart documentation includes differential considered and any EKGs or imaging independently interpreted by provider, where specified.  In additional to work up documented, I considered the following work up: Documented in chart, if applicable. and Other: Considered MRI however given the very brief transient symptoms, highly unlikely to be clinically helpful.    External consultation:  Discussion of management with another provider: Documented in chart, if applicable    Complicating factors:  Care impacted by chronic illness:  "N/A  Care affected by social determinants of health: N/A    Disposition considerations: Discharge. No recommendations on prescription strength medication(s). See documentation for any additional details.        =================================================================    HPI    Patient information was obtained from: Patient     Use of : N/A       Ha Dowell is a 58 year old male with a pertinent history of asthma, diverticulitis, kidney stones, and high cholesterol who presents to this ED via walk-in for evaluation of anxiety     The patient reports after coming home from a concert, he laid in bed and felt like he was wheezing and when he spoke \"it was like blowing air through [his] mouth\". The patient denies slurred speech, but reports that it felt like he was not able to form words well.   The patient reports this episode only lasted about a minute and has now completely resolved. The patient reports he was feeling \"very nervous\" and \"very jittery\" during this episode and still feels anxious right now.  The patient tried to sleep, but was nervous and came in to be seen and have \"slight piece of mind\".   The patient states that he has a \"huge fear\" of having a stroke in his sleep because his neighbor had one and was unable to recover.     The patient states his \"life is very stressful\" right now due to helping his mother, recent death of his father, and family friends passing away.    The patient denies any vision changes, headache, numbness, tingling, difficulty walking, unsteadiness, shortness of breath, abdominal pain, nausea, chest pain, or any other symptoms or complaints.     Social history: The patient denies alcohol use or smoking     REVIEW OF SYSTEMS   Review of Systems   Constitutional:  Negative for appetite change, chills, diaphoresis, fatigue and fever.   HENT:  Negative for congestion.    Eyes:  Negative for visual disturbance.   Respiratory:  Positive for wheezing (briefly " when lying in bed, resolved). Negative for cough, chest tightness and shortness of breath.    Cardiovascular:  Negative for chest pain and palpitations.   Gastrointestinal:  Negative for abdominal pain and nausea.   Genitourinary:  Negative for decreased urine volume.   Skin:  Negative for rash.   Neurological:  Positive for dizziness and speech difficulty (resolved - lasted 30s-1m). Negative for tremors, syncope, facial asymmetry, weakness, light-headedness, numbness and headaches.   Psychiatric/Behavioral:  The patient is nervous/anxious.    All other systems reviewed and are negative.       PAST MEDICAL HISTORY:  History reviewed. No pertinent past medical history.    PAST SURGICAL HISTORY:  History reviewed. No pertinent surgical history.        CURRENT MEDICATIONS:    albuterol (PROAIR HFA/PROVENTIL HFA/VENTOLIN HFA) 108 (90 BASE) MCG/ACT Inhaler  aspirin 81 MG EC tablet  atorvastatin (LIPITOR) 20 MG tablet  cetirizine (ZYRTEC) 10 MG tablet  finasteride (PROPECIA) 1 MG tablet  LORazepam (ATIVAN) 1 MG tablet  methylcellulose (CITRUCEL) 500 MG TABS tablet  montelukast (SINGULAIR) 10 MG tablet  Phenylephrine HCl (WAL-PHED PE PO)        ALLERGIES:  No Known Allergies    FAMILY HISTORY:  History reviewed. No pertinent family history.    SOCIAL HISTORY:   Social History     Socioeconomic History    Marital status: Single   Tobacco Use    Smoking status: Never    Smokeless tobacco: Never   Substance and Sexual Activity    Alcohol use: No    Drug use: No       VITALS:  /80   Pulse 81   Temp 98.1  F (36.7  C) (Oral)   Resp 23   Ht 1.829 m (6')   Wt 87.7 kg (193 lb 6.4 oz)   SpO2 95%   BMI 26.23 kg/m      PHYSICAL EXAM    Physical Exam  Vitals and nursing note reviewed.   Constitutional:       Appearance: Normal appearance. He is not ill-appearing or diaphoretic.      Comments: Patient awake and alert sitting upright in no distress.  Able to speak in full sentences.  Does appear anxious and worried about his  health as well as all of the family friends who have recently been ill or .   HENT:      Nose: Nose normal.      Mouth/Throat:      Mouth: Mucous membranes are moist.   Eyes:      Conjunctiva/sclera: Conjunctivae normal.   Cardiovascular:      Rate and Rhythm: Normal rate and regular rhythm.      Pulses: Normal pulses.   Pulmonary:      Effort: Pulmonary effort is normal.      Breath sounds: Normal breath sounds.   Musculoskeletal:         General: Normal range of motion.      Cervical back: Normal range of motion.   Skin:     General: Skin is warm and dry.      Capillary Refill: Capillary refill takes less than 2 seconds.   Neurological:      General: No focal deficit present.      Mental Status: He is alert and oriented to person, place, and time.      GCS: GCS eye subscore is 4. GCS verbal subscore is 5. GCS motor subscore is 6.      Cranial Nerves: No cranial nerve deficit, dysarthria or facial asymmetry.      Sensory: No sensory deficit.      Motor: No weakness or tremor.      Coordination: Coordination normal. Finger-Nose-Finger Test normal.      Gait: Gait normal.   Psychiatric:         Mood and Affect: Mood is anxious.            LAB:  All pertinent labs reviewed and interpreted.  Results for orders placed or performed during the hospital encounter of 23   Comprehensive metabolic panel   Result Value Ref Range    Sodium 138 135 - 145 mmol/L    Potassium 4.6 3.4 - 5.3 mmol/L    Carbon Dioxide (CO2) 25 22 - 29 mmol/L    Anion Gap 9 7 - 15 mmol/L    Urea Nitrogen 17.8 6.0 - 20.0 mg/dL    Creatinine 1.12 0.67 - 1.17 mg/dL    GFR Estimate 76 >60 mL/min/1.73m2    Calcium 9.8 8.6 - 10.0 mg/dL    Chloride 104 98 - 107 mmol/L    Glucose 121 (H) 70 - 99 mg/dL    Alkaline Phosphatase 65 40 - 129 U/L    AST 21 0 - 45 U/L    ALT 20 0 - 70 U/L    Protein Total 7.5 6.4 - 8.3 g/dL    Albumin 4.4 3.5 - 5.2 g/dL    Bilirubin Total 0.3 <=1.2 mg/dL   Result Value Ref Range    Lipase 53 13 - 60 U/L   CBC with platelets  and differential   Result Value Ref Range    WBC Count 6.5 4.0 - 11.0 10e3/uL    RBC Count 5.08 4.40 - 5.90 10e6/uL    Hemoglobin 16.2 13.3 - 17.7 g/dL    Hematocrit 46.5 40.0 - 53.0 %    MCV 92 78 - 100 fL    MCH 31.9 26.5 - 33.0 pg    MCHC 34.8 31.5 - 36.5 g/dL    RDW 13.2 10.0 - 15.0 %    Platelet Count 239 150 - 450 10e3/uL    % Neutrophils 60 %    % Lymphocytes 26 %    % Monocytes 10 %    % Eosinophils 2 %    % Basophils 1 %    % Immature Granulocytes 1 %    NRBCs per 100 WBC 0 <1 /100    Absolute Neutrophils 3.9 1.6 - 8.3 10e3/uL    Absolute Lymphocytes 1.7 0.8 - 5.3 10e3/uL    Absolute Monocytes 0.6 0.0 - 1.3 10e3/uL    Absolute Eosinophils 0.2 0.0 - 0.7 10e3/uL    Absolute Basophils 0.1 0.0 - 0.2 10e3/uL    Absolute Immature Granulocytes 0.1 <=0.4 10e3/uL    Absolute NRBCs 0.0 10e3/uL       RADIOLOGY:  Reviewed all pertinent imaging. Please see official radiology report.  No orders to display            EKG Interpretation:      Interpreted by Willie Mcarthur MD  Time reviewed:1216am   Symptoms at time of EKG: none   Rhythm: normal sinus   Rate: normal  Axis: NORMAL  Ectopy: none  Conduction: Low voltage QRS  ST Segments/ T Waves: No ST-T wave changes  Q Waves: none  Comparison to prior: No old EKG available    Clinical Impression: Sinus rhythm with low voltage QRS, no acute ischemic abnormality.      PROCEDURES:   Procedures      [facility] Documentation:   CMS Diagnoses:  none             I, Hawa Dickerson, am serving as a scribe to document services personally performed by Willie Mcarthur MD based on my observation and the provider's statements to me. I, Willie Mcarthur MD, attest that Hawa Dickerson is acting in a scribe capacity, has observed my performance of the services and has documented them in accordance with my direction.    Willie Mcarthur MD   [facility] EMERGENCY DEPARTMENT  [address] [place] [id number] [phone]       Blue  Willie Leger MD  11/10/23 0159

## 2023-11-10 NOTE — ED TRIAGE NOTES
"Pt went to a concert at [place], went home and at 2230 pt had sensation of trying to talk \"Only blowing air\". This episode lasted 30 seconds and pt is concerned he my have a stroke in his sleep. Pt's neuros are WNL at this time.     Triage Assessment (Adult)       Row Name 11/09/23 2192          Triage Assessment    Airway WDL WDL        Respiratory WDL    Respiratory WDL WDL        Skin Circulation/Temperature WDL    Skin Circulation/Temperature WDL WDL        Cardiac WDL    Cardiac WDL WDL        Peripheral/Neurovascular WDL    Peripheral Neurovascular WDL WDL        Cognitive/Neuro/Behavioral WDL    Cognitive/Neuro/Behavioral WDL WDL                     "

## 2023-11-12 LAB
ATRIAL RATE - MUSE: 81 BPM
DIASTOLIC BLOOD PRESSURE - MUSE: NORMAL MMHG
INTERPRETATION ECG - MUSE: NORMAL
P AXIS - MUSE: 38 DEGREES
PR INTERVAL - MUSE: 198 MS
QRS DURATION - MUSE: 72 MS
QT - MUSE: 376 MS
QTC - MUSE: 436 MS
R AXIS - MUSE: 39 DEGREES
SYSTOLIC BLOOD PRESSURE - MUSE: NORMAL MMHG
T AXIS - MUSE: 17 DEGREES
VENTRICULAR RATE- MUSE: 81 BPM

## 2024-01-08 ENCOUNTER — TRANSCRIBE ORDERS (OUTPATIENT)
Dept: OTHER | Age: 59
End: 2024-01-08

## 2024-01-08 DIAGNOSIS — S76.012A MUSCLE STRAIN OF LEFT GLUTEAL REGION, INITIAL ENCOUNTER: Primary | ICD-10-CM

## 2025-02-18 NOTE — ED NOTES
DC instructions reviewed with pt states understanding, questions answered. Pt ambulatory out of ED.   
Entered PT room and found PT sitting on gurney in position of comfort. PT states he was seen the other day in this ED and was told he had diverticulitis. PT states he has been taking the prescribed meds and is note getting any better and states now he is having right lower back pain. PT is A&OX4, appears to be in pain. All needs are being assessed and will be met and all comfort measures are being addressed. Awaiting MD rojas and orders at this time.   
PT back from CT and placed back on the monitor.   No changes in PT condition from previous assessments. All needs are being met and all comfort measures are being addressed. Awaiting MD dispo at this time. I will continue to assess and monitor PT.   
PT to CT   
Pt was here 3 days ago and started treatment for diverticulitis, now has back pain also. History of kidney stones.  
Male